# Patient Record
Sex: MALE | Race: WHITE | NOT HISPANIC OR LATINO | Employment: FULL TIME | URBAN - METROPOLITAN AREA
[De-identification: names, ages, dates, MRNs, and addresses within clinical notes are randomized per-mention and may not be internally consistent; named-entity substitution may affect disease eponyms.]

---

## 2017-01-23 ENCOUNTER — OFFICE VISIT - HEALTHEAST (OUTPATIENT)
Dept: PODIATRY | Facility: CLINIC | Age: 22
End: 2017-01-23

## 2017-01-23 DIAGNOSIS — L60.0 INGROWN TOENAIL: ICD-10-CM

## 2017-03-20 ENCOUNTER — OFFICE VISIT - HEALTHEAST (OUTPATIENT)
Dept: PODIATRY | Facility: CLINIC | Age: 22
End: 2017-03-20

## 2017-03-20 DIAGNOSIS — L60.0 INGROWN TOENAIL: ICD-10-CM

## 2017-05-16 ENCOUNTER — OFFICE VISIT - HEALTHEAST (OUTPATIENT)
Dept: FAMILY MEDICINE | Facility: CLINIC | Age: 22
End: 2017-05-16

## 2017-05-16 DIAGNOSIS — M27.2 ABSCESS OF JAW, RIGHT: ICD-10-CM

## 2017-05-16 DIAGNOSIS — L60.0 INGROWING TOENAIL WITH INFECTION: ICD-10-CM

## 2017-05-16 ASSESSMENT — MIFFLIN-ST. JEOR: SCORE: 1994.74

## 2017-05-31 ENCOUNTER — OFFICE VISIT - HEALTHEAST (OUTPATIENT)
Dept: PODIATRY | Facility: CLINIC | Age: 22
End: 2017-05-31

## 2017-05-31 DIAGNOSIS — L60.0 INGROWN TOENAIL: ICD-10-CM

## 2017-07-12 ENCOUNTER — OFFICE VISIT - HEALTHEAST (OUTPATIENT)
Dept: PODIATRY | Facility: CLINIC | Age: 22
End: 2017-07-12

## 2017-07-12 DIAGNOSIS — L60.0 INGROWN TOENAIL: ICD-10-CM

## 2018-03-31 ENCOUNTER — RECORDS - HEALTHEAST (OUTPATIENT)
Dept: GENERAL RADIOLOGY | Facility: CLINIC | Age: 23
End: 2018-03-31

## 2018-03-31 ENCOUNTER — OFFICE VISIT - HEALTHEAST (OUTPATIENT)
Dept: FAMILY MEDICINE | Facility: CLINIC | Age: 23
End: 2018-03-31

## 2018-03-31 DIAGNOSIS — R05.9 COUGH: ICD-10-CM

## 2018-03-31 DIAGNOSIS — R07.0 THROAT PAIN: ICD-10-CM

## 2018-03-31 LAB — DEPRECATED S PYO AG THROAT QL EIA: NORMAL

## 2018-04-01 LAB — GROUP A STREP BY PCR: NORMAL

## 2018-04-04 ENCOUNTER — OFFICE VISIT - HEALTHEAST (OUTPATIENT)
Dept: FAMILY MEDICINE | Facility: CLINIC | Age: 23
End: 2018-04-04

## 2018-04-04 DIAGNOSIS — Z00.00 PHYSICAL EXAM: ICD-10-CM

## 2018-04-04 LAB
ANION GAP SERPL CALCULATED.3IONS-SCNC: 14 MMOL/L (ref 5–18)
BUN SERPL-MCNC: 12 MG/DL (ref 8–22)
CALCIUM SERPL-MCNC: 9.9 MG/DL (ref 8.5–10.5)
CHLORIDE BLD-SCNC: 102 MMOL/L (ref 98–107)
CHOLEST SERPL-MCNC: 169 MG/DL
CO2 SERPL-SCNC: 25 MMOL/L (ref 22–31)
CREAT SERPL-MCNC: 0.73 MG/DL (ref 0.7–1.3)
ERYTHROCYTE [DISTWIDTH] IN BLOOD BY AUTOMATED COUNT: 11 % (ref 11–14.5)
FASTING STATUS PATIENT QL REPORTED: YES
GFR SERPL CREATININE-BSD FRML MDRD: >60 ML/MIN/1.73M2
GLUCOSE BLD-MCNC: 73 MG/DL (ref 70–125)
HCT VFR BLD AUTO: 46.4 % (ref 40–54)
HDLC SERPL-MCNC: 37 MG/DL
HGB BLD-MCNC: 15.8 G/DL (ref 14–18)
LDLC SERPL CALC-MCNC: 103 MG/DL
MCH RBC QN AUTO: 31 PG (ref 27–34)
MCHC RBC AUTO-ENTMCNC: 34 G/DL (ref 32–36)
MCV RBC AUTO: 91 FL (ref 80–100)
PLATELET # BLD AUTO: 333 THOU/UL (ref 140–440)
PMV BLD AUTO: 6.7 FL (ref 7–10)
POTASSIUM BLD-SCNC: 4.3 MMOL/L (ref 3.5–5)
RBC # BLD AUTO: 5.08 MILL/UL (ref 4.4–6.2)
SODIUM SERPL-SCNC: 141 MMOL/L (ref 136–145)
TRIGL SERPL-MCNC: 147 MG/DL
WBC: 7.7 THOU/UL (ref 4–11)

## 2018-04-04 ASSESSMENT — MIFFLIN-ST. JEOR: SCORE: 2006.64

## 2018-04-05 LAB — 25(OH)D3 SERPL-MCNC: 14.7 NG/ML (ref 30–80)

## 2018-04-11 ENCOUNTER — COMMUNICATION - HEALTHEAST (OUTPATIENT)
Dept: FAMILY MEDICINE | Facility: CLINIC | Age: 23
End: 2018-04-11

## 2018-08-09 ENCOUNTER — OFFICE VISIT - HEALTHEAST (OUTPATIENT)
Dept: FAMILY MEDICINE | Facility: CLINIC | Age: 23
End: 2018-08-09

## 2018-08-09 DIAGNOSIS — R07.9 ACUTE CHEST PAIN: ICD-10-CM

## 2018-08-09 DIAGNOSIS — R94.31 ST ELEVATION ON ECG: ICD-10-CM

## 2018-08-09 DIAGNOSIS — K29.70 GASTRITIS: ICD-10-CM

## 2018-08-09 LAB — DEPRECATED S PYO AG THROAT QL EIA: NORMAL

## 2018-08-10 ENCOUNTER — OFFICE VISIT - HEALTHEAST (OUTPATIENT)
Dept: FAMILY MEDICINE | Facility: CLINIC | Age: 23
End: 2018-08-10

## 2018-08-10 DIAGNOSIS — R07.9 CHEST PAIN: ICD-10-CM

## 2018-08-10 LAB
ATRIAL RATE - MUSE: 60 BPM
DIASTOLIC BLOOD PRESSURE - MUSE: NORMAL MMHG
GROUP A STREP BY PCR: NORMAL
INTERPRETATION ECG - MUSE: NORMAL
P AXIS - MUSE: 16 DEGREES
PR INTERVAL - MUSE: 160 MS
QRS DURATION - MUSE: 100 MS
QT - MUSE: 414 MS
QTC - MUSE: 414 MS
R AXIS - MUSE: 5 DEGREES
SYSTOLIC BLOOD PRESSURE - MUSE: NORMAL MMHG
T AXIS - MUSE: 25 DEGREES
VENTRICULAR RATE- MUSE: 60 BPM

## 2018-11-12 ENCOUNTER — OFFICE VISIT - HEALTHEAST (OUTPATIENT)
Dept: FAMILY MEDICINE | Facility: CLINIC | Age: 23
End: 2018-11-12

## 2018-11-12 DIAGNOSIS — J01.00 ACUTE NON-RECURRENT MAXILLARY SINUSITIS: ICD-10-CM

## 2018-11-12 DIAGNOSIS — H66.002 ACUTE SUPPURATIVE OTITIS MEDIA OF LEFT EAR WITHOUT SPONTANEOUS RUPTURE OF TYMPANIC MEMBRANE, RECURRENCE NOT SPECIFIED: ICD-10-CM

## 2018-11-12 LAB — DEPRECATED S PYO AG THROAT QL EIA: NORMAL

## 2018-11-13 LAB — GROUP A STREP BY PCR: NORMAL

## 2019-02-18 ENCOUNTER — COMMUNICATION - HEALTHEAST (OUTPATIENT)
Dept: FAMILY MEDICINE | Facility: CLINIC | Age: 24
End: 2019-02-18

## 2019-10-03 ENCOUNTER — OFFICE VISIT - HEALTHEAST (OUTPATIENT)
Dept: FAMILY MEDICINE | Facility: CLINIC | Age: 24
End: 2019-10-03

## 2019-10-03 DIAGNOSIS — J06.9 VIRAL UPPER RESPIRATORY TRACT INFECTION WITH COUGH: ICD-10-CM

## 2019-10-03 DIAGNOSIS — H66.003 NON-RECURRENT ACUTE SUPPURATIVE OTITIS MEDIA OF BOTH EARS WITHOUT SPONTANEOUS RUPTURE OF TYMPANIC MEMBRANES: ICD-10-CM

## 2019-10-03 RX ORDER — CETIRIZINE HYDROCHLORIDE 10 MG/1
10 TABLET ORAL DAILY
Status: SHIPPED | COMMUNITY
Start: 2019-10-03 | End: 2022-01-31

## 2021-05-26 ENCOUNTER — RECORDS - HEALTHEAST (OUTPATIENT)
Dept: ADMINISTRATIVE | Facility: CLINIC | Age: 26
End: 2021-05-26

## 2021-05-29 ENCOUNTER — RECORDS - HEALTHEAST (OUTPATIENT)
Dept: ADMINISTRATIVE | Facility: CLINIC | Age: 26
End: 2021-05-29

## 2021-05-31 VITALS — BODY MASS INDEX: 36.34 KG/M2 | WEIGHT: 231.5 LBS | HEIGHT: 67 IN

## 2021-06-01 VITALS — BODY MASS INDEX: 21.16 KG/M2 | WEIGHT: 133.1 LBS

## 2021-06-01 VITALS — BODY MASS INDEX: 37.46 KG/M2 | WEIGHT: 237.4 LBS

## 2021-06-01 VITALS — BODY MASS INDEX: 36.88 KG/M2 | HEIGHT: 67 IN | WEIGHT: 235 LBS

## 2021-06-02 VITALS — BODY MASS INDEX: 36.73 KG/M2 | WEIGHT: 231 LBS

## 2021-06-02 NOTE — PROGRESS NOTES
Walk In Care Note                                                                                 Date of Visit: 10/3/2019     Chief Complaint   Eduardo Sanchez is a(n) 24 y.o. White or  male who presents to [facility] with the following complaint(s):  Sinusitis (x3 days); Cough; Nasal Congestion; and Shortness of Breath (heavy feeling in chest)       Assessment and Plan   1. Non-recurrent acute suppurative otitis media of both ears without spontaneous rupture of tympanic membranes  - amoxicillin-clavulanate (AUGMENTIN) 875-125 mg per tablet; Take 1 tablet by mouth 2 (two) times a day for 10 days.  Dispense: 20 tablet; Refill: 0    2. Viral upper respiratory tract infection with cough      Advised patient that his upper respiratory symptoms are consistent with a viral upper respiratory tract infection rather than sinusitis based on duration of just 2 days. Patient does however have severe bilateral otitis media. Therefore treating with Augmentin as listed above. Recommended use of a probiotic while taking this antibiotic. Discussed symptomatic / supportive cares with rest, hydration, and over the counter analgesics.     Counseled patient regarding assessment and plan for evaluation and treatment. Questions were answered. See AVS for the specific written instructions and educational handout(s) regarding otitis media and viral URI that were provided at the conclusion of the visit.     Discussed signs / symptoms that warrant urgent / emergent medical attention.     Follow up as needed.      History of Present Illness   Primary symptom: Cold / Cough  Onset: 2 days ago  Progression: Worsening  Fevers: No  Chills: Yes  Sore throat: Initially, now resolved  Nasal congestion: Yes  Rhinorrhea: Yes, green  Sinus pain / pressure: Yes, frontal and maxillary  Ear pain: Pressure bilaterally  Headache: No  Body aches: No  Cough: Yes, intermittent, mainly while talking  Shortness of breath: At times  Sputum production:  Yes, green / brown  Rash: No  GI symptoms: None  Additional symptoms: None  Home therapies utilized: Cetirizine yesterday and today  History of environmental allergies: No  History of asthma: No  Exposure to influenza: No  Exposure to strep: No  Other ill contacts: 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       Physical Exam   Vitals:    10/03/19 1540 10/03/19 1543   BP: 145/81 135/40   Patient Site: Right Arm Right Arm   Patient Position: Sitting Sitting   Cuff Size: Adult Large Adult Large   Pulse: 73    Resp: 16    Temp: 98.9  F (37.2  C)    TempSrc: Oral    SpO2: 98%    Weight: (!) 228 lb (103.4 kg)      Physical Exam  Vitals signs and nursing note reviewed.   Constitutional:       General: He is not in acute distress.     Appearance: He is well-developed and overweight. He is not toxic-appearing.   HENT:      Head: Normocephalic and atraumatic.      Right Ear: Ear canal and external ear normal. Tympanic membrane is erythematous and bulging. Tympanic membrane is not perforated.      Left Ear: Ear canal and external ear normal. Tympanic membrane is erythematous and bulging. Tympanic membrane is not perforated.      Nose: Mucosal edema present. No rhinorrhea.      Mouth/Throat:      Mouth: Mucous membranes are moist. No oral lesions.      Pharynx: Posterior oropharyngeal erythema present. No oropharyngeal exudate.      Tonsils: No tonsillar exudate. Swelling: 3+ on the right. 3+ on the left.   Eyes:      General: Lids are normal.      Conjunctiva/sclera: Conjunctivae normal.   Neck:      Musculoskeletal: Neck supple. No edema or erythema.   Cardiovascular:      Rate and Rhythm: Normal rate and regular rhythm.      Heart sounds: S1 normal and S2 normal. No murmur. No friction rub. No gallop.    Pulmonary:      Effort: Pulmonary effort is normal.      Breath sounds:  Normal breath sounds. No stridor. No wheezing, rhonchi or rales.   Lymphadenopathy:      Cervical: No cervical adenopathy.   Skin:     General: Skin is warm and dry.      Coloration: Skin is not pale.      Findings: No rash.   Neurological:      General: No focal deficit present.      Mental Status: He is alert and oriented to person, place, and time.          Diagnostic Studies   Laboratory:  N/A  Radiology:  N/A  Electrocardiogram:  N/A     Procedure Note   N/A     Pertinent History   The following portions of the patient's history were reviewed and updated as appropriate: allergies, current medications, past family history, past medical history, past social history, past surgical history and problem list.    Patient has ST elevation on ECG on their problem list.    Patient has a past medical history of Pneumonia and ST elevation on ECG (08/10/2018).    Patient has no past surgical history on file.    Patient's family history includes Cancer in his maternal grandfather and paternal grandmother; Diabetes in his maternal aunt, maternal grandmother, mother, paternal grandfather, and paternal uncle; Heart attack in his maternal grandfather and maternal grandmother; Stroke in his maternal grandmother.    Patient reports that he has never smoked. He has never used smokeless tobacco. He reports that he does not drink alcohol or use drugs.     Portions of this note have been dictated using voice recognition software. Any grammatical or context distortions are unintentional and inherent to the software.     Elvis Aponte MD  [facility]

## 2021-06-03 VITALS
OXYGEN SATURATION: 98 % | BODY MASS INDEX: 36.25 KG/M2 | HEART RATE: 73 BPM | DIASTOLIC BLOOD PRESSURE: 40 MMHG | WEIGHT: 228 LBS | RESPIRATION RATE: 16 BRPM | TEMPERATURE: 98.9 F | SYSTOLIC BLOOD PRESSURE: 135 MMHG

## 2021-06-08 NOTE — PROGRESS NOTES
Subjective findings: The patient return to the clinic today for follow-up evaluation of infected left great toenail.  The patient stated he feels markedly improved.       Objective findings: Nails bilateral feet are normal length and normal color.  Left great toenail is well-healed.  There is no edema, erythema, cellulitis, drainage or bleeding noted. . Skin bilateral feet warm and intact. DP and PT pulses +2 over 4 bilateral feet. Capillary refill less than 2 seconds bilateral feet. Negative clonus, negative Babinski bilateral feet. Range of motion right gwithin normal limits bilateral feet. Muscle power +5 over 5 bilaterally within all compartments. .      Assessment: Onychocryptosis      Plan: I informed the patient that he is doing very well.  I recommended he return to clinic in 6 months for follow-up visit.  At that time a partial nail excision and matrixectomy of both borders of the left great toenail may need to be performed.

## 2021-06-09 NOTE — PROGRESS NOTES
Subjective findings: The patient returned to the clinic today for evaluation of his left great toenail.  He has undergone a partial nail excision and matrixectomy of the left great toenail.  He stated he has some mild to moderate discomfort with the nail.  He feels that the nail is not growing in properly.  He has no redness, swelling, drainage or bleeding.    Objective findings: Nails bilateral feet are normal length and color.  The hyponychium of the left great toenail appears to be going on top of the nail plate.  There is no pain on palpation of the nail plate of the left great toe.  Skin bilaterally warm and intact.  DP PT pulses +2 over 4 bilateral feet.  Capillary refill less than 2 seconds bilateral feet.  Negative clonus, negative Babinski bilateral feet.  Range of motion within normal limits bilateral feet.  Muscle power is 5 over 5 bilaterally in all compartments.    Assessment: Onychocryptosis    Plan: I told the patient that I liked with the nail to continue to grow out for the next 3 months.He is to return to clinic in 3 months.  If the nail continues to cause problems I'll recommend a total nail excision and matrixectomy of the left great toenail.

## 2021-06-10 NOTE — PROGRESS NOTES
Subjective findings: The patient returned to the clinic today for evaluation of his left great toenail.  He has undergone a partial nail excision and matrixectomy of the left great toenail.  He stated he has some mild to moderate discomfort with the nail.  He feels that the nail is not growing in properly.  He has pain, redness, swelling, drainage or bleeding.     Objective findings: Nails bilateral feet are normal length and color.  The hyponychium of the left great toenail appears to be going on top of the nail plate.  There is pain on palpation of the nail plate of the left great toe.  Skin bilaterally warm and intact.  DP and PT pulses +2 over 4 bilateral feet.  Capillary refill less than 2 seconds bilateral feet.  Negative clonus, negative Babinski bilateral feet.  Range of motion within normal limits bilateral feet.  Muscle power is 5 over 5 bilaterally in all compartments.     Assessment: Onychocryptosis     Plan: I performed total nail excision and matrixectomy of the left great toenail today under local anesthesia of 2% lidocaine plain via the phenol and alcohol technique.  The patient tolerated the procedure and anesthesia well and was discharged in good condition.  He is to return to the clinic as needed.

## 2021-06-10 NOTE — PROGRESS NOTES
Assessment/Plan:        Diagnoses and all orders for this visit:    Abscess of jaw, right-incision and drainage done today.  The patient tolerated this well.  The lesion appears to be MRSA so I did treat him with antibiotics that will cover that.  I recommended that he hot pack it at least 3 times a day 10 minutes at a time to help dry out any remaining pus.  He should complete all the antibiotics.  If he worsens or does not improve with treatment he should return to the clinic.  He was instructed to keep the current dressing on until tomorrow.  He should keep it clean and dry.  After that he should keep the lesion open to the air as much as possible to allow faster healing.  -     sulfamethoxazole-trimethoprim (SEPTRA DS) 800-160 mg per tablet; Take 1 tablet by mouth 2 (two) times a day for 10 days.  Dispense: 20 tablet; Refill: 0    Ingrowing toenail with infection-he does have an appointment with Dr. Domínguez to have this removed.  I did give him the information today so he can try to get that appointment sooner than next month.          Subjective:    Patient ID: Eduardo Sanchez is a 21 y.o. male.    HPI: Patient has had a red raised lump on the right side of his neck for the past few days.  Overnight last night, he got much bigger and is painful today makes it difficult for him to turn his head.  He has not had a fever chills.  He had one of these on the back of his left leg at about the same time last year that got quite large and it went away with hot packs.  He also has an infected left great ingrown toenail.  He has had ingrown toenails on both feet.  He has an appointment with Dr. Domínguez to have the left nail removed in June.  He wants to know if he can get an earlier appointment for that as it is bothering him.  The one on the right toe is fine now.  He does not have any known immune deficiencies.    The following portions of the patient's history were reviewed and updated as appropriate: allergies, current  medications, past family history, past medical history, past social history, past surgical history and problem list.    Review of Systems      12 system review negative other than HPI    Objective:    Physical Exam         Patient is in no apparent physical distress. Head and face normal.  Vitals are as recorded.   Conjunctiva are clear.  Neck 5 cm erythematous raised pustule on right side of neck, some fluctuance, eroded skin in center. Tender.  2  1 mm pustules in erythematous area.    Extremities are without edema.   Skin is without rashes.  Mood and affect are appropriate.    Procedure note: The abscess was prepped in sterile fashion.  EMLA cream was used for topical anesthesia.  The abscess was incised with an 11 blade scalpel.  Copious amounts of purulent discharge were expressed.  There were no complications.  Dressing was placed.

## 2021-06-11 NOTE — PROGRESS NOTES
Subjective findings: The patient returned to the clinic today for evaluation of his right great toenail.  He has undergone a partial nail excision and matrixectomy of the right great toenail.  He stated he has some mild to moderate discomfort with the nail.  He feels that the nail is not growing in properly.  He has pain, redness, swelling, drainage or bleeding.      Objective findings: Nails bilateral feet are normal length and color.  The medial lateral borders of the right great toenail incurvated once again.  There is pain on palpation of the nail plate of the right great toe.  Skin bilaterally warm and intact.  DP and PT pulses +2 over 4 bilateral feet.  Capillary refill less than 2 seconds bilateral feet.  Negative clonus, negative Babinski bilateral feet.  Range of motion within normal limits bilateral feet.  Muscle power is 5 over 5 bilaterally in all compartments.      Assessment: Onychocryptosis right great toe      Plan: I performed total nail excision and matrixectomy of the right great toenail today under local anesthesia of 2% lidocaine plain via the phenol and alcohol technique.  The patient tolerated the procedure and anesthesia well and was discharged in good condition.  He is to return to the clinic as needed.

## 2021-06-16 PROBLEM — R94.31 ST ELEVATION ON ECG: Status: ACTIVE | Noted: 2018-08-10

## 2021-06-17 NOTE — PATIENT INSTRUCTIONS - HE
Patient Instructions by Elvis Aponte MD at 10/3/2019  3:30 PM     Author: Elvis Aponte MD Service: -- Author Type: Physician    Filed: 10/3/2019  4:22 PM Encounter Date: 10/3/2019 Status: Addendum    : Elvis Aponte MD (Physician)    Related Notes: Original Note by Elvis Aponte MD (Physician) filed at 10/3/2019  4:21 PM       - Take the full course of Augmentin as prescribed.   - Take a probiotic while taking this antibiotic. This can be purchased over the counter at your local pharmacy.   Patient Education     Middle Ear Infection (Adult)  You have an infection of the middle ear, the space behind the eardrum. This is also called acute otitis media (AOM). Sometimes it is caused by the common cold. This is because congestion can block the internal passage (eustachian tube) that drains fluid from the middle ear. When the middle ear fills with fluid, bacteria can grow there and cause an infection. Oral antibiotics are used to treat this illness, not ear drops. Symptoms usually start to improve within 1 to 2 days of treatment.    Home care  The following are general care guidelines:    Finish all of the antibiotic medicine given, even though you may feel better after the first few days.    You may use over-the-counter medicine, such as acetaminophen or ibuprofen, to control pain and fever, unless something else was prescribed. If you have chronic liver or kidney disease or have ever had a stomach ulcer or gastrointestinal bleeding, talk with your healthcare provider before using these medicines. Do not give aspirin to anyone under 18 years of age who has a fever. It may cause severe illness or death.  Follow-up care  Follow up with your healthcare provider, or as advised, in 2 weeks if all symptoms have not gotten better, or if hearing doesn't go back to normal within 1 month.  When to seek medical advice  Call your healthcare provider right away if any of these occur:    Ear pain gets  worse or does not improve after 3 days of treatment    Unusual drowsiness or confusion    Neck pain, stiff neck, or headache    Fluid or blood draining from the ear canal    Fever of 100.4 F (38 C) or as advised     Seizure  Date Last Reviewed: 6/1/2016 2000-2017 [employer]. [address] [place] [id number]. All rights reserved. This information is not intended as a substitute for professional medical care. Always follow your healthcare professional's instructions.           Patient Education     Viral Upper Respiratory Illness (Adult)  You have a viral upper respiratory illness (URI), which is another term for the common cold. This illness is contagious during the first few days. It is spread through the air by coughing and sneezing. It may also be spread by direct contact (touching the sick person and then touching your own eyes, nose, or mouth). Frequent handwashing will decrease risk of spread. Most viral illnesses go away within 7 to 10 days with rest and simple home remedies. Sometimes the illness may last for several weeks. Antibiotics will not kill a virus, and they are generally not prescribed for this condition.    Home care    If symptoms are severe, rest at home for the first 2 to 3 days. When you resume activity, don't let yourself get too tired.    Avoid being exposed to cigarette smoke (yours or others).    You may use acetaminophen or ibuprofen to control pain and fever, unless another medicine was prescribed. If you have chronic liver or kidney disease, have ever had a stomach ulcer or gastrointestinal bleeding, or are taking blood-thinning medicines, talk with your healthcare provider before using these medicines. Aspirin should never be given to anyone under 18 years of age who is ill with a viral infection or fever. It may cause severe liver or brain damage.    Your appetite may be poor, so a light diet is fine. Avoid dehydration by drinking 6 to 8 glasses of fluids per  day (water, soft drinks, juices, tea, or soup). Extra fluids will help loosen secretions in the nose and lungs.    Over-the-counter cold medicines will not shorten the length of time youre sick, but they may be helpful for the following symptoms: cough, sore throat, and nasal and sinus congestion. (Note: Do not use decongestants if you have high blood pressure.)  Follow-up care  Follow up with your healthcare provider, or as advised.  When to seek medical advice  Call your healthcare provider right away if any of these occur:    Cough with lots of colored sputum (mucus)    Severe headache; face, neck, or ear pain    Difficulty swallowing due to throat pain    Fever of 100.4 F (38 C) or higher, or as directed by your healthcare provider  Call 911  Call 911 if any of these occur:    Chest pain, shortness of breath, wheezing, or difficulty breathing    Coughing up blood    Inability to swallow due to throat pain  Date Last Reviewed: 9/13/2015 2000-2017 [employer]. [address] [place] [address], [place] [address]. All rights reserved. This information is not intended as a substitute for professional medical care. Always follow your healthcare professional's instructions.

## 2021-06-17 NOTE — PROGRESS NOTES
Assessment:      Healthy male exam.      Plan:       Routine lab work will be done today.  Patient will be called with abnormalities.  I encouraged him to start an aerobic exercise program and a weight loss type diet.  He will try to stay aerobically active.  Tetanus and hepatitis A vaccines are updated today.     Subjective:      Eduardo Sanchez is a 22 y.o. male who presents for an annual exam. The patient reports that there is not domestic violence in his life.  Overall, he is feeling well.  He has been quite busy.  He finishes college this month.  He also is getting  in June of this year.  He is looking for a job as an .  He has no health issues or concerns.  He does not have any exercise program going on at this point in time.  He really does not follow any type of diet either.    Healthy Habits:   Regular Exercise: No  Sunscreen Use: Yes  Healthy Diet: No  Dental Visits Regularly: Yes  Seat Belt: Yes  Sexually active: Yes  Monthly Self Testicular Exams:  No  Hemoccults: No  Flex Sig: No  Colonoscopy: No  Lipid Profile: Yes  Glucose Screen: Yes      Immunization History   Administered Date(s) Administered     DTaP, historic 1996, 1996, 02/25/1997, 12/22/1997, 02/05/2001     Hep A, historic 08/24/2010     Hep B, historic 1996, 1996, 02/25/1997     HiB, historic,unspecified 1996, 1996, 02/25/1997, 12/22/1997     IPV 1996, 1996, 12/22/1997, 02/05/2001     MMR 12/22/1997, 02/05/2001     Meningococcal MCV4P 07/10/2008     Td,adult,historic,unspecified 07/10/2008     Tdap 07/10/2008     Varicella 12/22/1997, 02/05/2001     Immunization status: up to date and documented, tetanus and hepatitis A updated today..    No exam data present     Current Outpatient Prescriptions   Medication Sig Dispense Refill     benzonatate (TESSALON) 200 MG capsule Take 1 capsule (200 mg total) by mouth 3 (three) times a day as needed for cough. 30 capsule 0     No  "current facility-administered medications for this visit.      Past Medical History:   Diagnosis Date     Pneumonia     Created by Conversion      No past surgical history on file.  Review of patient's allergies indicates no known allergies.  Family History   Problem Relation Age of Onset     Diabetes Mother      Diabetes Maternal Aunt      Diabetes Paternal Uncle      Diabetes Maternal Grandmother      Heart attack Maternal Grandmother      Stroke Maternal Grandmother      Cancer Maternal Grandfather      Heart attack Maternal Grandfather      Cancer Paternal Grandmother      Diabetes Paternal Grandfather      Social History     Social History     Marital status: Single     Spouse name: N/A     Number of children: N/A     Years of education: N/A     Occupational History     Not on file.     Social History Main Topics     Smoking status: Never Smoker     Smokeless tobacco: Never Used     Alcohol use No     Drug use: No     Sexual activity: Not on file     Other Topics Concern     Not on file     Social History Narrative       Review of Systems  Review of Systems   Constitutional: Negative.  Negative for fatigue.   HENT: Negative.    Eyes: Negative.    Respiratory: Negative.  Negative for cough and shortness of breath.    Cardiovascular: Negative.  Negative for chest pain.   Gastrointestinal: Negative.  Negative for constipation and diarrhea.   Endocrine: Negative.    Genitourinary: Negative.    Musculoskeletal: Negative.    Skin: Negative.    Allergic/Immunologic: Negative.    Neurological: Negative.    Hematological: Negative.    Psychiatric/Behavioral: Negative.              Objective:     Vitals:    04/04/18 1246   BP: 124/80   Pulse: (!) 57   Resp: 16   Temp: 97.9  F (36.6  C)   TempSrc: Oral   Weight: (!) 235 lb (106.6 kg)   Height: 5' 6.5\" (1.689 m)     Body mass index is 37.36 kg/(m^2).    Physical  Physical Exam   Constitutional: He is oriented to person, place, and time. He appears well-developed and " well-nourished. No distress.   HENT:   Right Ear: External ear normal.   Left Ear: External ear normal.   Nose: Nose normal.   Mouth/Throat: Oropharynx is clear and moist.   Eyes: Conjunctivae and EOM are normal. Pupils are equal, round, and reactive to light.   Neck: Normal range of motion. Neck supple. No JVD present. No thyromegaly present.   Cardiovascular: Normal rate, regular rhythm and normal heart sounds.    No murmur heard.  Pulmonary/Chest: Effort normal and breath sounds normal. No respiratory distress.   Abdominal: Soft. Bowel sounds are normal. He exhibits no mass. There is no tenderness.   Genitourinary: Penis normal.   Genitourinary Comments: No hernias.  Testicles nontender and without masses.   Musculoskeletal: Normal range of motion. He exhibits no edema or tenderness.   Lymphadenopathy:     He has no cervical adenopathy.   Neurological: He is alert and oriented to person, place, and time. He has normal reflexes. No cranial nerve deficit.   Skin: Skin is warm.   Psychiatric: He has a normal mood and affect.

## 2021-06-17 NOTE — PROGRESS NOTES
Chief Complaint   Patient presents with     Cough     3 days     Fatigue     3 days     Nasal Congestion     Ear Fullness       HPI    Patient is here for 3 days of cough productive with green and sometimes bloody sputum, associated with shortness of breath. He also reported having moderate sore throat. Minimal nasal congestion. NO fever, chills, chest pain, body aches. NO home remedies so far.     ROS: Pertinent ROS noted in HPI.     No Known Allergies    Patient Active Problem List   Diagnosis   (none) - all problems resolved or deleted       Family History   Problem Relation Age of Onset     Diabetes Mother      Diabetes Maternal Aunt      Diabetes Paternal Uncle      Diabetes Maternal Grandmother      Heart attack Maternal Grandmother      Stroke Maternal Grandmother      Cancer Maternal Grandfather      Heart attack Maternal Grandfather      Cancer Paternal Grandmother      Diabetes Paternal Grandfather        Social History     Social History     Marital status: Single     Spouse name: N/A     Number of children: N/A     Years of education: N/A     Occupational History     Not on file.     Social History Main Topics     Smoking status: Never Smoker     Smokeless tobacco: Never Used     Alcohol use No     Drug use: No     Sexual activity: Not on file     Other Topics Concern     Not on file     Social History Narrative         Objective:    Vitals:    03/31/18 1312   BP: 124/68   Pulse: 61   Temp: 98.1  F (36.7  C)   SpO2: 96%       Gen:NAD  Throat: oropharynx clear, tonsils normal  Ears: TMs clear without effusions, ear canals normal with small cerumen  Nose: no discharge  Neck:No adenopathy   CV: RRR, normal S1S2, no M, R, G  Pulm: CTAB, normal effort    Recent Results (from the past 24 hour(s))   Rapid Strep A Screen-Throat   Result Value Ref Range    Rapid Strep A Antigen No Group A Strep detected, presumptive negative No Group A Strep detected, presumptive negative       CXR - clear lungs per my  interpretation, discussed during visit.      Cough  -     XR Chest 2 Views; Future  -     benzonatate (TESSALON) 200 MG capsule; Take 1 capsule (200 mg total) by mouth 3 (three) times a day as needed for cough.    Throat pain  -     Rapid Strep A Screen-Throat  -     Group A Strep, RNA Direct Detection, Throat      Likely virally mediated symptoms, benign exam. F/u as directed.

## 2021-06-18 NOTE — LETTER
Letter by Omkar Miguel MD at      Author: Omkar Miguel MD Service: -- Author Type: --    Filed:  Encounter Date: 2/18/2019 Status: (Other)       March 4, 2019    Eduardo Sanchez  [address] [place] [address]      Dear Eduardo,    [facility] staff was able to verify that you have not yet established care with a new healthcare provider.     As a reminder, Dr. Miguel has recently retired from the clinic.     Please contact the [facility], and a member of our clinical staff would be happy to assist you with scheduling an appointment to set up care with a new physician.     Please call [phone], and ask to schedule an establish care appointment with a new provider.     Thank you!

## 2021-06-19 NOTE — PROGRESS NOTES
Patient been seen yesterday for abdominal pain, chest pain, shortness of breath.  Presumed gastritis at that time, but final read of EKG showed concern for possible pericarditis.  I spoke to the patient today on the phone and instructed him to seek emergency medical attention if his chest pain returned.  He returned to the clinic, but I discussed that there is little that we could do to further reassure him at the clinic.  Patient chose to go to the emergency department for pericarditis rule out instead.

## 2021-06-19 NOTE — PROGRESS NOTES
Subjective:      Patient ID: Eduardo Sanchez is a 22 y.o. male.    Chief Complaint:    HPI Eduardo Sanchez is a 22 y.o. male who presents today complaining of abdominal pain that started yesterday morning. As the went on his symptoms worsened.  This morning he woke up in his abdominal pain was gone, but around lunchtime the symptoms returned again this is about an hour after eating chipotle.  He reports that he does not have an appetite much today or last night.  Had one episode of diarrhea without any blood last night.  He has not had any diarrhea today.  It is worse abdominal pain was a 7 out of 10 crampy pain.  It is generalized and not focal.  Is currently a 4/10.  He was also experiencing chest pain and shortness of breath.  He describes the chest pain as achy numbness and experienced at last night when he was laying in bed.  At that time he was also feeling short of breath.  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  He reports that he has been belching more often lately.  Patient denies any urinary symptoms such as dysuria or urinary frequency.  He has not had any fever, sore throat, nausea, vomiting, cough, wheezing, or runny nose.  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  There are no known strep positive children at the school right now.      Past Medical History:   Diagnosis Date     Pneumonia     Created by Conversion          Social History   Substance Use Topics     Smoking status: Never Smoker     Smokeless tobacco: Never Used     Alcohol use No       Review of Systems   Constitutional: Positive for fatigue. Negative for fever.   HENT: Negative for congestion, rhinorrhea and sore throat.    Respiratory: Positive for shortness of breath. Negative for cough and wheezing.    Cardiovascular: Positive for chest pain (Achy when laying down). Negative for palpitations.   Gastrointestinal: Positive for abdominal pain  and diarrhea. Negative for blood in stool, nausea and vomiting.        (+)  belching   Genitourinary: Negative for dysuria, frequency and hematuria.       Objective:     /77 (Patient Site: Right Arm, Patient Position: Sitting, Cuff Size: Adult Large)  Pulse 66  Temp 97.9  F (36.6  C) (Oral)   Resp 18  Wt 133 lb 1.6 oz (60.4 kg)  SpO2 96%  BMI 21.16 kg/m2    Physical Exam   Constitutional: He appears well-developed and well-nourished. No distress.   HENT:   Head: Normocephalic and atraumatic.   Right Ear: External ear normal.   Left Ear: External ear normal.   Eyes: Conjunctivae are normal.   Cardiovascular: Normal rate, regular rhythm and normal heart sounds.  Exam reveals no gallop and no friction rub.    No murmur heard.  Pulmonary/Chest: Effort normal and breath sounds normal. No respiratory distress. He has no wheezes. He has no rales. He exhibits no tenderness.   Abdominal: Soft. Bowel sounds are normal. There is generalized tenderness. There is no rigidity, no rebound, no guarding, no tenderness at McBurney's point and negative Dawkins's sign.   Neg Rovsing sign   Skin: He is not diaphoretic.   Psychiatric: He has a normal mood and affect. His behavior is normal. Judgment and thought content normal.   Nursing note and vitals reviewed.     Labs:  Recent Results (from the past 24 hour(s))   Electrocardiogram Perform and Read   Result Value Ref Range    SYSTOLIC BLOOD PRESSURE  mmHg    DIASTOLIC BLOOD PRESSURE  mmHg    VENTRICULAR RATE 60 BPM    ATRIAL RATE 60 BPM    P-R INTERVAL 160 ms    QRS DURATION 100 ms    Q-T INTERVAL 414 ms    QTC CALCULATION (BEZET) 414 ms    P Axis 16 degrees    R AXIS 5 degrees    T AXIS 25 degrees    MUSE DIAGNOSIS       Normal sinus rhythm  Nonspecific ST abnormality  Abnormal ECG  No previous ECGs available     Rapid Strep A Screen-Throat   Result Value Ref Range    Rapid Strep A Antigen No Group A Strep detected, presumptive negative No Group A Strep detected,  presumptive negative       Clinical Decision Making:  During the patient's age and lack of risk factors and low suspicion of this being cardiac related.  There is no significant ischemic changes on EKG today.  Patient's chest pain is not associated with activity, but rather started when laying down after eating dinner.  This is association with midsternal achiness, belching, and generalized abdominal discomfort, with diarrhea leads me to believe that this is likely reflux causing the chest pain or shortness of breath.  And gastritis causing the generalized abdominal discomfort.  Patient was reassured by this.  He was given a GI cocktail in the clinic today.  RST was negative, it was tested because of his contact with children.  There is no focal tenderness on abdominal exam indicative of acute appendicitis or cholecystitis.  Patient was started on Prilosec today instructed to adhere to a bland diet.    Assessment:     Procedures    1. Gastritis  aluminum-magnesium hydroxide-simethicone 15 mL, viscous lidocaine HC 15 mL (GI COCKTAIL)    omeprazole (PRILOSEC) 20 MG capsule   2. Acute chest pain  Electrocardiogram Perform and Read    Rapid Strep A Screen-Throat    Group A Strep, RNA Direct Detection, Throat         Patient Instructions   1.  I suspect that your chest pain and shortness of breath are likely due to reflux of stomach acid.  Your EKG did not show any signs of significant abnormalities that would be concerning.  He did not have any cardiac disease risk factors.  And the description of your pain is not consistent with cardiac or pulmonary related disease.  2.  I recommend that you begin taking Prilosec tomorrow morning.  Take 1 tablet 20 minutes before your morning meal.  Do this for 14 days.  For acute abdominal pain he may also supplement with Tums as needed.  3.  Your rapid strep test was negative today.  You will only be notified if the confirmatory strep test results if they are positive and require an  antibiotic.  4.  Follow-up with your primary care provider if your symptoms are not improving over the course of the next 7 days.  5.  Seek emergency medical attention if you develop significant shortness of breath that persists, severe pressure chest pain, or more localized and severe abdominal pain.  6.  Try to adhere to a bland diet.  Bananas, rice, applesauce, and toast are all good.  Avoid spicy foods or a lot of dairy.

## 2021-06-21 NOTE — PROGRESS NOTES
"ASSESSMENT/PLAN:   1. Acute suppurative otitis media of left ear without spontaneous rupture of tympanic membrane, recurrence not specified  Rapid Strep A Screen-Throat swab    Group A Strep, RNA Direct Detection, Throat   2. Acute non-recurrent maxillary sinusitis  amoxicillin (AMOXIL) 875 MG tablet   Patient appears well and is tolerating oral intake. No signs of peritonsillar or retropharyngeal abscess on exam. Clear lungs. Strep test negative however there is a left otitis media present, prescription for amoxicillin sent to pharmacy.Symptomatic cares for URI symptoms advised.    At the end of the encounter, I discussed results, diagnosis, medications. Discussed red flags for immediate return to clinic/ER, as well as indications for follow up if no improvement. Please view below patient instructions for patient education and return precautions as were discussed during visit.  Patient/parent understood and agreed to plan. Patient was stable for discharge.      Patient Instructions:  Patient Instructions   You have an infection of your left ear.  You also likely have a sinus infection.    Take amoxicillin twice daily with food. Take a probiotic such as Culturelle or Florastor while on the antibiotic or eat a Greek yogurt containing \"live active cultures\" daily.    Buffered normal saline nasal irrigation   The benefits   1. Saline (saltwater) washes the mucus and irritants from your nose.   2. The sinus passages are moisturized.   3. Studies have also shown that a nasal irrigation improves cell function (the cells that move the mucus work better).   The recipe   Use a one-quart glass jar that is thoroughly cleansed.   You may use a large medical syringe (30 cc), water pick with an irrigation tip (preferred method), squeeze bottle, or Neti pot. Do not use a baby bulb syringe. The syringe or pick should be sterilized frequently or replaced every two to three weeks to avoid contamination and infection.   Fill with " water that has been distilled, previously boiled, or otherwise sterilized. Plain tap water is not recommended, because it is not necessarily sterile.   Add 1 to 1  heaping teaspoons of pickling/isatu salt. Do not use table salt, because it contains a large number of additives.   Add 1 teaspoon of baking soda (pure bicarbonate).   Mix ingredients together, and store at room temperature. Discard after one week.   You may also make up a solution from premixed packets that are commercially prepared specifically for nasal irrigation.   The instructions   Irrigate your nose with saline one to two times per day.   If you have been told to use nasal medication, you should always use your saline solution first. The nasal medication is much more effective when sprayed onto clean nasal membranes, and the spray will reach deeper into the nose.   Pour the amount of fluid you plan to use into a clean bowl. Do not put your used syringe back into the storage container, because it contaminates your solution.   You may warm the solution slightly in the microwave, but be sure that the solution is not hot.   Bend over the sink (some people do this in the shower) and squirt the solution into each side of your nose, aiming the stream toward the back of your head, not the top of your head. The solution should flow into one nostril and out of the other, but it will not harm you if you swallow a little.   Some people experience a little burning sensation the first few times they use buffered saline solution, but this usually goes away after they adapt to it.     Return with worsening.                    SUBJECTIVE:   Eduardo Sanchez is a 23 y.o. male  who presents today for evaluation of 2 days of sore throat, nausea, diarrhea and headaches.  Also complains of bilateral ear pain.  Has productive cough with green sputum.  Denies fevers, but endorses sweats, chills and body aches.  No vomiting.  No rashes. Is a  with  multiple strep exposures.  No meds for symptoms.    Past Medical History:  Patient Active Problem List   Diagnosis     ST elevation on ECG     Acute non-recurrent maxillary sinusitis       Surgical History:    Reviewed; Non-contributory    Family History:  Family History   Problem Relation Age of Onset     Diabetes Mother      Diabetes Maternal Aunt      Diabetes Paternal Uncle      Diabetes Maternal Grandmother      Heart attack Maternal Grandmother      Stroke Maternal Grandmother      Cancer Maternal Grandfather      Heart attack Maternal Grandfather      Cancer Paternal Grandmother      Diabetes Paternal Grandfather        Reviewed; Non-contributory      Social History:    Social History     Tobacco Use   Smoking Status Never Smoker   Smokeless Tobacco Never Used     Smoking:  Alcohol use:  Other drug use:  Occupation:      Smoke exposure:  :  Living situation:    Current Medications:  Current Outpatient Medications on File Prior to Visit   Medication Sig Dispense Refill     benzonatate (TESSALON) 200 MG capsule Take 1 capsule (200 mg total) by mouth 3 (three) times a day as needed for cough. 30 capsule 0     No current facility-administered medications on file prior to visit.        Allergies:   No Known Allergies    I personally reviewed patient's past medical, surgical, social, family history and allergies.    ROS:  Comprehensive 12 pt ROS completed, positives noted in HPI, otherwise negative.      OBJECTIVE:   /70 (Patient Site: Right Arm, Patient Position: Sitting, Cuff Size: Adult Large)   Pulse 63   Temp 98.1  F (36.7  C) (Oral)   Resp 16   Wt (!) 231 lb (104.8 kg)   SpO2 99%   BMI 36.73 kg/m        General Appearance:  Alert, well-appearing male in NAD. Afebrile.    Integument: Warm, dry  HEENT:  Head: Atraumatic, normocephalic. Face nontraumatic.  Eyes: Conjunctiva clear, Lids normal.  Ears:  Right TMs pearly, translucent, left TM is injected and bulging. No canal erythema or edema.  No mastoid tenderness. No pain with palpation over tragus.  Nose: nares patent. Mild erythema of nasal mucosa. No rhinorrhea.  Oropharynx:  No trismus. Mild posterior pharyngeal erythema. No palatal petechiae. 1+ tonsillar hypertrophy, no exudate. Uvula midline. Moist mucus membranes.  Neck: Supple, anterior cervical lymphadenopathy. No meningismus.  Respiratory: No distress. Lungs clear to ausculation bilaterally. No crackles, wheezes, rhonchi or stridor.  Cardiovascular: Regular rate and rhythm, no murmur, rub or gallop. No obvious chest wall deformities. Peripheral pulses 2+ bilaterally. No peripheral edema.  GI: Soft, nontender, normal bowel sounds. No masses, organomegaly, rigidity, or guarding.           Radiology:  I personally ordered and viewed this study. I agree with below radiology findings.    none    Laboratory Studies:  I personally ordered and interpreted these studies.    Results for orders placed or performed in visit on 11/12/18   Rapid Strep A Screen-Throat swab   Result Value Ref Range    Rapid Strep A Antigen No Group A Strep detected, presumptive negative No Group A Strep detected, presumptive negative         Alana Henriquez, CNP

## 2021-06-24 NOTE — TELEPHONE ENCOUNTER
Clinic staff is reaching out to remind patient that Dr. Miguel has recently retired from practice at the [facility], and offer assistance establishing care with a different provider at [place].    Unable to leave voice message at home number.

## 2021-06-26 ENCOUNTER — HEALTH MAINTENANCE LETTER (OUTPATIENT)
Age: 26
End: 2021-06-26

## 2021-07-03 NOTE — ADDENDUM NOTE
Addendum Note by Hugo Roche DPM at 5/31/2017 10:13 AM     Author: Hugo Roche DPM Service: -- Author Type: Physician    Filed: 5/31/2017 10:13 AM Encounter Date: 5/31/2017 Status: Signed    : Hugo Roche DPM (Physician)    Addended by: HUGO ROCHE on: 5/31/2017 10:13 AM        Modules accepted: Orders

## 2021-10-16 ENCOUNTER — HEALTH MAINTENANCE LETTER (OUTPATIENT)
Age: 26
End: 2021-10-16

## 2022-01-31 ENCOUNTER — OFFICE VISIT (OUTPATIENT)
Dept: FAMILY MEDICINE | Facility: CLINIC | Age: 27
End: 2022-01-31
Payer: COMMERCIAL

## 2022-01-31 VITALS
BODY MASS INDEX: 38.32 KG/M2 | RESPIRATION RATE: 16 BRPM | SYSTOLIC BLOOD PRESSURE: 129 MMHG | HEART RATE: 68 BPM | OXYGEN SATURATION: 97 % | WEIGHT: 241 LBS | TEMPERATURE: 97.4 F | DIASTOLIC BLOOD PRESSURE: 82 MMHG

## 2022-01-31 DIAGNOSIS — R05.9 COUGH: Primary | ICD-10-CM

## 2022-01-31 DIAGNOSIS — R07.0 THROAT PAIN: ICD-10-CM

## 2022-01-31 LAB
DEPRECATED S PYO AG THROAT QL EIA: NEGATIVE
GROUP A STREP BY PCR: NOT DETECTED

## 2022-01-31 PROCEDURE — 99213 OFFICE O/P EST LOW 20 MIN: CPT | Performed by: PHYSICIAN ASSISTANT

## 2022-01-31 PROCEDURE — U0005 INFEC AGEN DETEC AMPLI PROBE: HCPCS | Mod: 90 | Performed by: PHYSICIAN ASSISTANT

## 2022-01-31 PROCEDURE — 99000 SPECIMEN HANDLING OFFICE-LAB: CPT | Performed by: PHYSICIAN ASSISTANT

## 2022-01-31 PROCEDURE — U0003 INFECTIOUS AGENT DETECTION BY NUCLEIC ACID (DNA OR RNA); SEVERE ACUTE RESPIRATORY SYNDROME CORONAVIRUS 2 (SARS-COV-2) (CORONAVIRUS DISEASE [COVID-19]), AMPLIFIED PROBE TECHNIQUE, MAKING USE OF HIGH THROUGHPUT TECHNOLOGIES AS DESCRIBED BY CMS-2020-01-R: HCPCS | Mod: 90 | Performed by: PHYSICIAN ASSISTANT

## 2022-01-31 PROCEDURE — 87651 STREP A DNA AMP PROBE: CPT | Performed by: PHYSICIAN ASSISTANT

## 2022-01-31 ASSESSMENT — ENCOUNTER SYMPTOMS
SORE THROAT: 1
WHEEZING: 0
NAUSEA: 0
FEVER: 0
COUGH: 1
DIARRHEA: 0
RHINORRHEA: 0
HEADACHES: 1
SHORTNESS OF BREATH: 1
VOMITING: 0
FATIGUE: 1

## 2022-01-31 NOTE — LETTER
January 31, 2022      Eduardo Sanchez  [address] [place] [id number]        To Whom It May Concern:    Eduardo Sanchez  was seen on 1/31/2022.  Please excuse him until he has his COVID test result. His test should take between 1-2 days.      Sincerely,        Kaelyn Brock PA-C

## 2022-01-31 NOTE — PATIENT INSTRUCTIONS
1) Increase fluids and rest  2) Try Mucinex and Neti pot over the counter for congestion  3) Continue taking Tylenol/Ibuprofen for fever/pain relief as needed.  4) Salt water gargles and lozenges can be helpful for throat relief  5) You will only be notified of the confirmatory strep results if they are positive.

## 2022-01-31 NOTE — PROGRESS NOTES
Patient presents with:  Pharyngitis: (tonsils touching) x5 days, 2 negative covid tests  Cough  Nasal Congestion  Breathing Problem      Clinical Decision Making: Patient experiencing sick symptoms for 5 days.  He has had multiple negative Covid test, but we will recheck this today.  RST is negative, confirmatory strep test in process.  Physical exam is benign.  Lungs are clear to auscultation patient is vitally stable.  Suspect viral URI.  We discussed supportive cares.  Patient has questions about when to return to work.  I recommend waiting for his last Covid test and then returning if he is feeling any better and remains fever free.      ICD-10-CM    1. Cough  R05.9 Symptomatic; Yes; 1/26/2022 COVID-19 Virus (Coronavirus) by PCR Nose   2. Throat pain  R07.0 Streptococcus A Rapid Screen w/Reflex to PCR - Clinic Collect     Group A Streptococcus PCR Throat Swab       Patient Instructions   1) Increase fluids and rest  2) Try Mucinex and Neti pot over the counter for congestion  3) Continue taking Tylenol/Ibuprofen for fever/pain relief as needed.  4) Salt water gargles and lozenges can be helpful for throat relief  5) You will only be notified of the confirmatory strep results if they are positive.         HPI:  Eduardo Sanchez is a 26 year old male who presents today complaining of cough, HA, ST, fatigue, nasal congestion x 5 days. Patient has been taking nothing for his symptoms. NKSC, but patient is a . Patient has had 2 negative COVID tests both Antigen and PCR . Patient is fully vaccinated and boosted against COVID. He has been feeling more out of breath with walking up stairs. He reports a rash on his chest and back. He denies any itching or pain of the rash. The rash has been here since last June unchanged.     History obtained from the patient.    Problem List:  2018-08: ST elevation on ECG      No past medical history on file.    Social History     Tobacco Use     Smoking status: Never  Smoker     Smokeless tobacco: Never Used   Substance Use Topics     Alcohol use: No       Review of Systems   Constitutional: Positive for fatigue. Negative for fever.   HENT: Positive for congestion and sore throat. Negative for ear pain and rhinorrhea.    Respiratory: Positive for cough (productive) and shortness of breath (with exertion). Negative for wheezing.    Gastrointestinal: Negative for diarrhea, nausea and vomiting.   Skin: Positive for rash.   Neurological: Positive for headaches.       Vitals:    01/31/22 1012   BP: 129/82   BP Location: Left arm   Patient Position: Sitting   Cuff Size: Adult Large   Pulse: 68   Resp: 16   Temp: 97.4  F (36.3  C)   TempSrc: Oral   SpO2: 97%   Weight: 109.3 kg (241 lb)       Physical Exam  Vitals and nursing note reviewed.   Constitutional:       General: He is not in acute distress.     Appearance: He is not toxic-appearing or diaphoretic.   HENT:      Head: Normocephalic and atraumatic.      Right Ear: Tympanic membrane and external ear normal.      Left Ear: Tympanic membrane, ear canal and external ear normal.      Mouth/Throat:      Mouth: Mucous membranes are moist.      Pharynx: Posterior oropharyngeal erythema present. No oropharyngeal exudate.   Eyes:      Conjunctiva/sclera: Conjunctivae normal.   Cardiovascular:      Rate and Rhythm: Normal rate and regular rhythm.      Heart sounds: No murmur heard.      Pulmonary:      Effort: Pulmonary effort is normal. No respiratory distress.      Breath sounds: No stridor. No wheezing, rhonchi or rales.   Lymphadenopathy:      Cervical: No cervical adenopathy.   Neurological:      Mental Status: He is alert.   Psychiatric:         Mood and Affect: Mood normal.         Behavior: Behavior normal.         Thought Content: Thought content normal.         Judgment: Judgment normal.         Labs:  Results for orders placed or performed in visit on 01/31/22   Streptococcus A Rapid Screen w/Reflex to PCR - Clinic Collect      Status: Normal    Specimen: Throat; Swab   Result Value Ref Range    Group A Strep antigen Negative Negative         At the end of the encounter, I discussed results, diagnosis, medications. Discussed red flags for immediate return to clinic/ER, as well as indications for follow up if no improvement. Patient understood and agreed to plan. Patient was stable for discharge.

## 2022-02-01 LAB — SARS-COV-2 RNA RESP QL NAA+PROBE: NOT DETECTED

## 2022-02-15 ENCOUNTER — OFFICE VISIT (OUTPATIENT)
Dept: FAMILY MEDICINE | Facility: CLINIC | Age: 27
End: 2022-02-15
Payer: COMMERCIAL

## 2022-02-15 VITALS
HEART RATE: 57 BPM | DIASTOLIC BLOOD PRESSURE: 70 MMHG | OXYGEN SATURATION: 99 % | HEIGHT: 68 IN | BODY MASS INDEX: 37.04 KG/M2 | WEIGHT: 244.4 LBS | SYSTOLIC BLOOD PRESSURE: 116 MMHG

## 2022-02-15 DIAGNOSIS — Z23 NEED FOR INFLUENZA VACCINATION: ICD-10-CM

## 2022-02-15 DIAGNOSIS — Z11.59 NEED FOR HEPATITIS C SCREENING TEST: ICD-10-CM

## 2022-02-15 DIAGNOSIS — Z00.00 ROUTINE HEALTH MAINTENANCE: Primary | ICD-10-CM

## 2022-02-15 DIAGNOSIS — B35.4 TINEA CORPORIS: ICD-10-CM

## 2022-02-15 DIAGNOSIS — Z13.220 SCREENING FOR LIPID DISORDERS: ICD-10-CM

## 2022-02-15 DIAGNOSIS — Z11.4 SCREENING FOR HIV (HUMAN IMMUNODEFICIENCY VIRUS): ICD-10-CM

## 2022-02-15 DIAGNOSIS — Z13.1 SCREENING FOR DIABETES MELLITUS: ICD-10-CM

## 2022-02-15 LAB
ANION GAP SERPL CALCULATED.3IONS-SCNC: 10 MMOL/L (ref 5–18)
BUN SERPL-MCNC: 13 MG/DL (ref 8–22)
CALCIUM SERPL-MCNC: 9.6 MG/DL (ref 8.5–10.5)
CHLORIDE BLD-SCNC: 103 MMOL/L (ref 98–107)
CHOLEST SERPL-MCNC: 156 MG/DL
CO2 SERPL-SCNC: 27 MMOL/L (ref 22–31)
CREAT SERPL-MCNC: 0.76 MG/DL (ref 0.7–1.3)
FASTING STATUS PATIENT QL REPORTED: ABNORMAL
GFR SERPL CREATININE-BSD FRML MDRD: >90 ML/MIN/1.73M2
GLUCOSE BLD-MCNC: 95 MG/DL (ref 70–125)
HDLC SERPL-MCNC: 33 MG/DL
HIV 1+2 AB+HIV1 P24 AG SERPL QL IA: NEGATIVE
LDLC SERPL CALC-MCNC: 94 MG/DL
POTASSIUM BLD-SCNC: 4.4 MMOL/L (ref 3.5–5)
SODIUM SERPL-SCNC: 140 MMOL/L (ref 136–145)
TRIGL SERPL-MCNC: 144 MG/DL

## 2022-02-15 PROCEDURE — 99213 OFFICE O/P EST LOW 20 MIN: CPT | Mod: 25 | Performed by: NURSE PRACTITIONER

## 2022-02-15 PROCEDURE — 36415 COLL VENOUS BLD VENIPUNCTURE: CPT | Performed by: NURSE PRACTITIONER

## 2022-02-15 PROCEDURE — 90686 IIV4 VACC NO PRSV 0.5 ML IM: CPT | Performed by: NURSE PRACTITIONER

## 2022-02-15 PROCEDURE — 80061 LIPID PANEL: CPT | Performed by: NURSE PRACTITIONER

## 2022-02-15 PROCEDURE — 86803 HEPATITIS C AB TEST: CPT | Performed by: NURSE PRACTITIONER

## 2022-02-15 PROCEDURE — 87389 HIV-1 AG W/HIV-1&-2 AB AG IA: CPT | Performed by: NURSE PRACTITIONER

## 2022-02-15 PROCEDURE — 90471 IMMUNIZATION ADMIN: CPT | Performed by: NURSE PRACTITIONER

## 2022-02-15 PROCEDURE — 99395 PREV VISIT EST AGE 18-39: CPT | Mod: 25 | Performed by: NURSE PRACTITIONER

## 2022-02-15 PROCEDURE — 80048 BASIC METABOLIC PNL TOTAL CA: CPT | Performed by: NURSE PRACTITIONER

## 2022-02-15 RX ORDER — KETOCONAZOLE 20 MG/G
CREAM TOPICAL 2 TIMES DAILY
Qty: 60 G | Refills: 0 | Status: SHIPPED | OUTPATIENT
Start: 2022-02-15

## 2022-02-15 ASSESSMENT — MIFFLIN-ST. JEOR: SCORE: 2064.12

## 2022-02-15 NOTE — PROGRESS NOTES
SUBJECTIVE:   CC: Eduardo Sanchez is an 26 year old male who presents for preventative health visit.     Patient also presents to [facility] care.  Patient is  with a 5-month-old daughter, Kimberlyn.  He works as a .    Patient is not on any regular medications.  He denies any chronic medical conditions.    He does donate plasma regularly.    Complains of a rash on his chest for the last 9 months or so.  Reports red, round lesions.  They are not painful or pruritic.  No drainage.  No treatments tried at home.    Patient declines and HPV vaccination today.    Healthy Habits:     Getting at least 3 servings of Calcium per day:  Yes    Bi-annual eye exam:  Yes    Dental care twice a year:  NO    Sleep apnea or symptoms of sleep apnea:  None    Diet:  Regular (no restrictions)    Frequency of exercise:  1 day/week    Duration of exercise:  Less than 15 minutes    Taking medications regularly:  Not Applicable    Medication side effects:  Not applicable    PHQ-2 Total Score: 0    Additional concerns today:  Yes              Today's PHQ-2 Score:   PHQ-2 ( 1999 [employer]) 2/15/2022   Q1: Little interest or pleasure in doing things 0   Q2: Feeling down, depressed or hopeless 0   PHQ-2 Score 0   Q1: Little interest or pleasure in doing things Not at all   Q2: Feeling down, depressed or hopeless Not at all   PHQ-2 Score 0       Abuse: Current or Past(Physical, Sexual or Emotional)- No  Do you feel safe in your environment? Yes    Have you ever done Advance Care Planning? (For example, a Health Directive, POLST, or a discussion with a medical provider or your loved ones about your wishes): No, advance care planning information given to patient to review.  Patient plans to discuss their wishes with loved ones or provider.      Social History     Tobacco Use     Smoking status: Never Smoker     Smokeless tobacco: Never Used   Substance Use Topics     Alcohol use: No         Alcohol Use 2/15/2022  "  Prescreen: >3 drinks/day or >7 drinks/week? No       Last PSA: No results found for: PSA    Reviewed orders with patient. Reviewed health maintenance and updated orders accordingly - Yes    Reviewed and updated as needed this visit by clinical staff   Tobacco  Allergies  Meds  Problems  Med Hx  Surg Hx  Fam Hx          Reviewed and updated as needed this visit by Provider   Tobacco  Allergies  Meds  Problems  Med Hx  Surg Hx  Fam Hx           Review of Systems  Pertinent items in HPI    OBJECTIVE:   /70   Pulse 57   Ht 1.721 m (5' 7.75\")   Wt 110.9 kg (244 lb 6.4 oz)   SpO2 99%   BMI 37.44 kg/m      Physical Exam  GENERAL: healthy, alert and no distress  EYES: Eyes grossly normal to inspection, PERRL and conjunctivae and sclerae normal  HENT: ear canals and TM's normal, nose and mouth without ulcers or lesions  NECK: no adenopathy, no asymmetry, masses, or scars and thyroid normal to palpation  RESP: lungs clear to auscultation - no rales, rhonchi or wheezes  CV: regular rate and rhythm, normal S1 S2, no S3 or S4, no murmur, click or rub, no peripheral edema  ABDOMEN: soft, nontender, no hepatosplenomegaly, no masses and bowel sounds normal  MS: no gross musculoskeletal defects noted, no edema  SKIN: flat, round, erythematous plaques to chest wall.  NEURO: Normal strength and tone, mentation intact and speech normal  PSYCH: mentation appears normal, affect normal/bright      ASSESSMENT/PLAN:   Eduardo was seen today for physical.    Diagnoses and all orders for this visit:    Routine health maintenance    Need for influenza vaccination  -     INFLUENZA VACCINE IM >6 MO VALENT IIV4 (ALFURIA/FLUZONE)    Screening for HIV (human immunodeficiency virus)  -     HIV Antigen Antibody Combo    Need for hepatitis C screening test  -     Hepatitis C Screen Reflex to HCV RNA Quant and Genotype    Screening for diabetes mellitus  -     Basic metabolic panel    Screening for lipid disorders  -     Lipid " "Profile (Chol, Trig, HDL, LDL calc)    Tinea corporis  -     ketoconazole (NIZORAL) 2 % external cream; Apply topically 2 times daily    Other orders  -     REVIEW OF HEALTH MAINTENANCE PROTOCOL ORDERS          COUNSELING:   Reviewed preventive health counseling, as reflected in patient instructions    Estimated body mass index is 37.44 kg/m  as calculated from the following:    Height as of this encounter: 1.721 m (5' 7.75\").    Weight as of this encounter: 110.9 kg (244 lb 6.4 oz).     Weight management plan: Discussed healthy diet and exercise guidelines    He reports that he has never smoked. He has never used smokeless tobacco.        Jennifer Corbett NP  [facility]  "

## 2022-02-16 LAB — HCV AB SERPL QL IA: NONREACTIVE

## 2022-09-25 ENCOUNTER — HEALTH MAINTENANCE LETTER (OUTPATIENT)
Age: 27
End: 2022-09-25

## 2023-05-13 ENCOUNTER — HEALTH MAINTENANCE LETTER (OUTPATIENT)
Age: 28
End: 2023-05-13

## 2024-07-20 ENCOUNTER — HEALTH MAINTENANCE LETTER (OUTPATIENT)
Age: 29
End: 2024-07-20